# Patient Record
Sex: FEMALE | Race: WHITE | Employment: OTHER | URBAN - METROPOLITAN AREA
[De-identification: names, ages, dates, MRNs, and addresses within clinical notes are randomized per-mention and may not be internally consistent; named-entity substitution may affect disease eponyms.]

---

## 2024-03-02 ENCOUNTER — HOSPITAL ENCOUNTER (OUTPATIENT)
Age: 89
Discharge: HOME | End: 2024-03-02
Payer: MEDICARE

## 2024-03-02 ENCOUNTER — APPOINTMENT (OUTPATIENT)
Dept: GENERAL RADIOLOGY | Age: 89
End: 2024-03-02
Attending: EMERGENCY MEDICINE
Payer: MEDICARE

## 2024-03-02 VITALS
RESPIRATION RATE: 18 BRPM | OXYGEN SATURATION: 97 % | SYSTOLIC BLOOD PRESSURE: 140 MMHG | DIASTOLIC BLOOD PRESSURE: 64 MMHG | TEMPERATURE: 97 F | HEART RATE: 79 BPM

## 2024-03-02 DIAGNOSIS — J01.10 ACUTE NON-RECURRENT FRONTAL SINUSITIS: Primary | ICD-10-CM

## 2024-03-02 DIAGNOSIS — J02.9 SORE THROAT: ICD-10-CM

## 2024-03-02 DIAGNOSIS — J98.01 BRONCHOSPASM: ICD-10-CM

## 2024-03-02 LAB
POCT INFLUENZA A: NEGATIVE
POCT INFLUENZA B: NEGATIVE

## 2024-03-02 PROCEDURE — 94640 AIRWAY INHALATION TREATMENT: CPT | Performed by: EMERGENCY MEDICINE

## 2024-03-02 PROCEDURE — 71046 X-RAY EXAM CHEST 2 VIEWS: CPT | Performed by: EMERGENCY MEDICINE

## 2024-03-02 PROCEDURE — 99203 OFFICE O/P NEW LOW 30 MIN: CPT | Performed by: EMERGENCY MEDICINE

## 2024-03-02 PROCEDURE — 87502 INFLUENZA DNA AMP PROBE: CPT | Performed by: EMERGENCY MEDICINE

## 2024-03-02 RX ORDER — BENZONATATE 100 MG/1
100 CAPSULE ORAL 3 TIMES DAILY PRN
Qty: 30 CAPSULE | Refills: 0 | Status: SHIPPED | OUTPATIENT
Start: 2024-03-02

## 2024-03-02 RX ORDER — ATORVASTATIN CALCIUM 40 MG/1
40 TABLET, FILM COATED ORAL DAILY
COMMUNITY
Start: 2024-01-24

## 2024-03-02 RX ORDER — ALBUTEROL SULFATE 90 UG/1
AEROSOL, METERED RESPIRATORY (INHALATION)
Qty: 1 EACH | Refills: 0 | Status: SHIPPED | OUTPATIENT
Start: 2024-03-02

## 2024-03-02 RX ORDER — AZITHROMYCIN 250 MG/1
TABLET, FILM COATED ORAL
Qty: 6 TABLET | Refills: 0 | Status: SHIPPED | OUTPATIENT
Start: 2024-03-02 | End: 2024-03-07

## 2024-03-02 RX ORDER — IPRATROPIUM BROMIDE AND ALBUTEROL SULFATE 2.5; .5 MG/3ML; MG/3ML
3 SOLUTION RESPIRATORY (INHALATION) ONCE
Status: COMPLETED | OUTPATIENT
Start: 2024-03-02 | End: 2024-03-02

## 2024-03-02 RX ORDER — WARFARIN SODIUM 5 MG/1
5 TABLET ORAL DAILY
COMMUNITY

## 2024-03-02 RX ORDER — METOPROLOL SUCCINATE 50 MG/1
50 TABLET, EXTENDED RELEASE ORAL
COMMUNITY
Start: 2023-06-13

## 2024-03-02 NOTE — DISCHARGE INSTRUCTIONS
If you start the azithromycin please contact your primary care.  Your INR should be checked Wednesday of this week, and Wednesday of the following week.  Azithromycin is taken for 5 days, but stays in your system for 10 to 15 days.  Fall precautions, and bruising precautions as we discussed.  As we discussed antibiotics do not treat symptoms will treat the infection.  If it helps out with other symptoms that is great, but this cough may linger for the next few weeks to few months.  If you are having coughing attacks you can use the inhaler.  You will press on the inhaler while using the spacer and inhale over 3 to 5 seconds.  Hold your breath for 10 seconds, and then exhale.  Wait 60 seconds, and do the same for the second puff.  You may do this every 4-6 hours as needed.  Rinse your mouth after when you are done.   Over-the-counter Mucinex fast max all-in-one cold and flu multisymptom relief.  I do not believe that you need to do congestion, so avoid medications that have the initials D, or DM on there.  This means you do not need Sudafed at this time.  Tessalon Perles as a cough suppressant.  If you are using over-the-counter cold medication you can use this in between, or at the same time.  Tessalon Perles can be taken every 8 hours as needed.  Green, brown, and yellowish mucus is normal.  What is not normal is pink, red, or shabbir colored.

## 2024-03-02 NOTE — ED PROVIDER NOTES
Patient Seen in: [facility]      History     Chief Complaint   Patient presents with    Cough/URI     Stated Complaint: sore throat, cough    Subjective:   HPI  Juana Rock is a 88 year old female here for sore throat and cough for 1 week.  Also concern for congestion that she has been experiencing over the last 2 weeks that got better, and now returned.  No dizziness, visual changes, worst headache of her life, unilateral weakness, speech changes, or syncope.  Multiple OTC measures with some relief however symptoms been persistent.  No shortness of breath, hemoptysis, chest heaviness, or recent antibiotic use.  Immunizations up-to-date.  No acute distress.    Objective:   History reviewed. No pertinent past medical history.           History reviewed. No pertinent surgical history.             Social History     Socioeconomic History    Marital status:    Tobacco Use    Smoking status: Never    Smokeless tobacco: Never              Review of Systems    Positive for stated complaint: sore throat, cough  Other systems are as noted in HPI.  Constitutional and vital signs reviewed.      All other systems reviewed and negative except as noted above.    Physical Exam     ED Triage Vitals   BP 03/02/24 1439 140/64   Pulse 03/02/24 1344 79   Resp 03/02/24 1344 18   Temp 03/02/24 1344 97.4 °F (36.3 °C)   Temp src 03/02/24 1344 Temporal   SpO2 03/02/24 1344 97 %   O2 Device 03/02/24 1344 None (Room air)       Current:/64   Pulse 79   Temp 97.4 °F (36.3 °C) (Temporal)   Resp 18   SpO2 97%         Physical Exam  Vitals and nursing note reviewed.   Constitutional:       General: She is not in acute distress.     Appearance: Normal appearance. She is not ill-appearing, toxic-appearing or diaphoretic.   HENT:      Head: Normocephalic.      Right Ear: Tympanic membrane, ear canal and external ear normal.      Left Ear: Tympanic membrane, ear canal and external ear normal.      Nose:  Congestion and rhinorrhea present.      Mouth/Throat:      Mouth: Mucous membranes are moist.      Pharynx: No oropharyngeal exudate or posterior oropharyngeal erythema.   Eyes:      Extraocular Movements: Extraocular movements intact.      Conjunctiva/sclera: Conjunctivae normal.      Pupils: Pupils are equal, round, and reactive to light.   Cardiovascular:      Rate and Rhythm: Normal rate.      Pulses: Normal pulses.   Pulmonary:      Effort: Pulmonary effort is normal. No respiratory distress.      Breath sounds: Wheezing (mild BL upper) and rhonchi (anterior. cleared with cough) present.   Musculoskeletal:      Cervical back: Normal range of motion. No erythema, rigidity or tenderness. No pain with movement, spinous process tenderness or muscular tenderness. Normal range of motion.   Lymphadenopathy:      Cervical: No cervical adenopathy.      Right cervical: No superficial, deep or posterior cervical adenopathy.     Left cervical: No superficial, deep or posterior cervical adenopathy.   Skin:     General: Skin is warm.      Capillary Refill: Capillary refill takes less than 2 seconds.      Findings: No lesion or rash.   Neurological:      General: No focal deficit present.      Mental Status: She is alert and oriented to person, place, and time.   Psychiatric:         Mood and Affect: Mood normal.         Behavior: Behavior normal.         Thought Content: Thought content normal.         Judgment: Judgment normal.               ED Course     Labs Reviewed   POCT FLU TEST - Normal    Narrative:     This assay is a rapid molecular in vitro test utilizing nucleic acid amplification of influenza A and B viral RNA.                      MDM          Medical Decision Making  Differential diagnosis includes not limited to pneumonia, bronchospasm, COVID, flu, RSV, or somatic causes of symptoms.    We will treat for sinusitis, and bronchospasm.  Albuterol sent to the pharmacy, and spacer given here with teaching.   Symptoms better after generalized treatment.  No wheezing; prednisone not indicated at this time.  Azithromycin sent to pharmacy.  Unknown allergy history, but believes to be GI related.  Hives to penicillin medications.  On warfarin.  She will follow-up with primary Monday for INR recheck by Wednesday.  Last INR was 1.9, and 2.   Neuroexam normal without focal deficit.  Home care reviewed, and follow-up care reinforced.  Strict ER precautions, and reassurance given.  No acute distress, cleared for discharge home.    Problems Addressed:  Acute non-recurrent frontal sinusitis: acute illness or injury  Bronchospasm: acute illness or injury  Sore throat: acute illness or injury    Amount and/or Complexity of Data Reviewed  Independent Historian: caregiver  External Data Reviewed: notes.  Labs: ordered. Decision-making details documented in ED Course.     Details: Independent interpretation. Reviewed with patient and family  ECG/medicine tests: ordered and independent interpretation performed. Decision-making details documented in ED Course.     Details: Duo nebulizer verified with nurse.  No reaction from patient.    Risk  OTC drugs.  Prescription drug management.        Disposition and Plan     Clinical Impression:  1. Acute non-recurrent frontal sinusitis    2. Sore throat    3. Bronchospasm         Disposition:  Discharge  3/2/2024  2:54 pm    Follow-up:  pcp    Schedule an appointment as soon as possible for a visit in 3 days            Medications Prescribed:  Discharge Medication List as of 3/2/2024  2:54 PM        START taking these medications    Details   benzonatate (TESSALON PERLES) 100 MG Oral Cap Take 1 capsule (100 mg total) by mouth 3 (three) times daily as needed for cough., Normal, Disp-30 capsule, R-0      albuterol 108 (90 Base) MCG/ACT Inhalation Aero Soln With spacer. Inhale 1 puff and hold breath for 10 seconds then exhale.  Wait 1 full minute and repeat for second puff.  Use every 4-6 hours as  needed., Normal, Disp-1 each, R-0NPI [id number]. Collaborating MD Gris Callejas.      azithromycin (ZITHROMAX Z-RED) 250 MG Oral Tab 500 mg once followed by 250 mg daily x 4 days, Normal, Disp-6 tablet, R-0